# Patient Record
Sex: MALE | Race: BLACK OR AFRICAN AMERICAN
[De-identification: names, ages, dates, MRNs, and addresses within clinical notes are randomized per-mention and may not be internally consistent; named-entity substitution may affect disease eponyms.]

---

## 2018-01-29 ENCOUNTER — HOSPITAL ENCOUNTER (OUTPATIENT)
Dept: HOSPITAL 61 - RAD | Age: 68
Discharge: HOME | End: 2018-01-29
Attending: FAMILY MEDICINE
Payer: COMMERCIAL

## 2018-01-29 DIAGNOSIS — R07.9: Primary | ICD-10-CM

## 2018-01-29 PROCEDURE — 71046 X-RAY EXAM CHEST 2 VIEWS: CPT

## 2019-12-24 ENCOUNTER — HOSPITAL ENCOUNTER (OUTPATIENT)
Dept: HOSPITAL 61 - RAD | Age: 69
Discharge: HOME | End: 2019-12-24
Attending: FAMILY MEDICINE
Payer: COMMERCIAL

## 2019-12-24 DIAGNOSIS — R05: ICD-10-CM

## 2019-12-24 DIAGNOSIS — J06.9: Primary | ICD-10-CM

## 2019-12-24 PROCEDURE — 71046 X-RAY EXAM CHEST 2 VIEWS: CPT

## 2019-12-24 NOTE — RAD
PA and lateral chest.

 

HISTORY: Upper Respiratory tract infection, J06.9

 

PA and lateral views were taken of the chest. Lungs are free of 

infiltrates. Heart is normal in size. There is no pleural effusion. Aorta 

is mildly tortuous.

 

IMPRESSION:

1. No acute infiltrates.

 

Electronically signed by: James Marin MD (12/24/2019 1:44 PM) [id number]